# Patient Record
Sex: FEMALE | Race: WHITE
[De-identification: names, ages, dates, MRNs, and addresses within clinical notes are randomized per-mention and may not be internally consistent; named-entity substitution may affect disease eponyms.]

---

## 2021-03-11 ENCOUNTER — HOSPITAL ENCOUNTER (EMERGENCY)
Dept: HOSPITAL 7 - FB.ED | Age: 76
Discharge: HOME | End: 2021-03-11
Payer: MEDICARE

## 2021-03-11 DIAGNOSIS — M62.830: Primary | ICD-10-CM

## 2021-03-11 DIAGNOSIS — R14.3: ICD-10-CM

## 2021-03-11 DIAGNOSIS — Z79.899: ICD-10-CM

## 2021-03-11 DIAGNOSIS — Z88.0: ICD-10-CM

## 2021-03-11 PROCEDURE — 85027 COMPLETE CBC AUTOMATED: CPT

## 2021-03-11 PROCEDURE — 80053 COMPREHEN METABOLIC PANEL: CPT

## 2021-03-11 PROCEDURE — 99284 EMERGENCY DEPT VISIT MOD MDM: CPT

## 2021-03-11 PROCEDURE — 96374 THER/PROPH/DIAG INJ IV PUSH: CPT

## 2021-03-11 PROCEDURE — 96375 TX/PRO/DX INJ NEW DRUG ADDON: CPT

## 2021-03-11 PROCEDURE — 36415 COLL VENOUS BLD VENIPUNCTURE: CPT

## 2021-03-11 PROCEDURE — 86140 C-REACTIVE PROTEIN: CPT

## 2021-03-11 PROCEDURE — 81001 URINALYSIS AUTO W/SCOPE: CPT

## 2021-03-11 NOTE — EDM.PDOC
ED HPI GENERAL MEDICAL PROBLEM





- General


Chief Complaint: Abdominal Pain


Stated Complaint: LOWER BACK PAIN


Time Seen by Provider: 03/11/21 13:25


Source of Information: Reports: Patient


History Limitations: Reports: No Limitations





- History of Present Illness


INITIAL COMMENTS - FREE TEXT/NARRATIVE: 





states she developed sudden onset of left side lower back yesterday: localized ,

non radiating 


then got worse today with pain in the lower abd 


pain in lower abd seemed to be reduced by urination 


discussed this with pt 


states she had renal calculi many years ago 


Onset: Gradual


Onset Date: 03/10/21


Duration: Getting Worse


Location: Reports: Abdomen


Quality: Reports: Ache, Burning, Dull, Pressure


Severity: Moderate


Improves with: Reports: None


Worsens with: Reports: Movement


Associated Symptoms: Reports: Other (nausea , no vomiting)





- Related Data


                                    Allergies











Allergy/AdvReac Type Severity Reaction Status Date / Time


 


Penicillins Allergy  Fainting Verified 03/11/21 14:46











Home Meds: 


                                    Home Meds





Baclofen 10 mg PO BID #30 tablet 03/11/21 [Rx]


Lidocaine 5% [Lidoderm 5%] 1 patch TOP DAILY #30 patch 03/11/21 [Rx]


Multivitamin [Multiple Vitamins] 1 each PO DAILY 03/11/21 [History]


Ondansetron [Zofran ODT] 4 mg PO Q6H PRN #20 tab.dis 03/11/21 [Rx]


atorvaSTATin [Lipitor] 40 mg PO BEDTIME 03/11/21 [History]











Past Medical History


Musculoskeletal History: Reports: Back Pain, Chronic, Fracture


Other Musculoskeletal History: Left ankle fracture with surgical fixation





ED ROS GENERAL





- Review of Systems


Review Of Systems: Comprehensive ROS is negative, except as noted in HPI.





ED EXAM, RENAL/





- Physical Exam


Exam: See Below


Exam Limited By: No Limitations


General Appearance: Alert, WD/WN, No Apparent Distress


Eye Exam: Bilateral Eye: EOMI


Ears: Normal External Exam


Nose: Normal Inspection


Throat/Mouth: Normal Oropharynx


Head: Atraumatic, Normocephalic


Neck: Supple, Non-Tender


Respiratory/Chest: No Respiratory Distress, Lungs Clear, Rales


Cardiovascular: Regular Rate, Rhythm


GI/Abdominal: Distended, Tender (in lower abdomen)


Back Exam: Muscle Spasm (in SI joint region), Paraspinal Tenderness (on the left

 side)


Extremities: Normal Inspection, Normal Range of Motion


Neurological: Alert, Oriented, CN II-XII Intact


Psychiatric: Normal Affect


Skin Exam: Warm





Course





- Vital Signs


Last Recorded V/S: 


                                Last Vital Signs











Temp  36.8 C   03/11/21 15:01


 


Pulse  96   03/11/21 15:01


 


Resp  18   03/11/21 15:01


 


BP  103/52 L  03/11/21 15:01


 


Pulse Ox  99   03/11/21 15:01














- Orders/Labs/Meds


Labs: 


                                Laboratory Tests











  03/11/21 03/11/21 03/11/21 Range/Units





  13:31 13:45 13:45 


 


WBC   9.3   (3.0-10.3)  x10-3/uL


 


RBC   4.93   (3.60-5.20)  x10(6)uL


 


Hgb   14.5   (11.4-15.5)  g/dL


 


Hct   42.4   (34.2-48.2)  %


 


MCV   86.1   (76.7-100.5)  fL


 


MCH   29.4   (23.9-33.9)  pg


 


MCHC   34.1   (31.9-34.8)  g/dL


 


RDW   14.0   (12.3-16.5)  %


 


Plt Count   231   (151-488)  x10(3)uL


 


Sodium    139  (135-145)  mmol/L


 


Potassium    3.7  (3.5-5.3)  mmol/L


 


Chloride    101  (100-110)  mmol/L


 


Carbon Dioxide    27  (21-32)  mmol/L


 


BUN    15  (7-18)  mg/dL


 


Creatinine    1.0  (0.55-1.02)  mg/dL


 


Est Cr Clr Drug Dosing    TNP  


 


Estimated GFR (MDRD)    54 L  (>60)  


 


BUN/Creatinine Ratio    15.0  (9-20)  


 


Glucose    145 H  ()  mg/dL


 


Calcium    9.0  (8.6-10.2)  mg/dL


 


Total Bilirubin    0.9  (0.1-1.3)  mg/dL


 


AST    30 H  (5-25)  IU/L


 


ALT    33  (12-36)  U/L


 


Alkaline Phosphatase    58  ()  IU/L


 


C-Reactive Protein     (0.5-0.9)  mg/dL


 


Total Protein    7.7  (6.0-8.0)  g/dL


 


Albumin    4.6  (3.2-4.6)  g/dL


 


Globulin    3.1  g/dL


 


Albumin/Globulin Ratio    1.5  


 


Urine Color  Yellow    (YELLOW)  


 


Urine Appearance  Clear    (CLEAR)  


 


Urine pH  7.0 H    (5.0-6.5)  


 


Ur Specific Gravity  1.010    (1.010-1.025)  


 


Urine Protein  Negative    (NEGATIVE)  mg/dL


 


Urine Glucose (UA)  Normal    (NORMAL)  mg/dL


 


Urine Ketones  50 H    (NEGATIVE)  mg/dL


 


Urine Occult Blood  Negative    (NEGATIVE)  


 


Urine Nitrite  Negative    (NEGATIVE)  


 


Urine Bilirubin  Negative    (NEGATIVE)  


 


Urine Urobilinogen  Normal    (NEGATIVE)  mg/dL


 


Ur Leukocyte Esterase  Negative    (NEGATIVE)  


 


Urine WBC  0-5    (0-5)  


 


Ur Squamous Epith Cells  Few H    (NS,R,O)  


 


Urine Bacteria  Few H    (NS)  














  03/11/21 Range/Units





  13:45 


 


WBC   (3.0-10.3)  x10-3/uL


 


RBC   (3.60-5.20)  x10(6)uL


 


Hgb   (11.4-15.5)  g/dL


 


Hct   (34.2-48.2)  %


 


MCV   (76.7-100.5)  fL


 


MCH   (23.9-33.9)  pg


 


MCHC   (31.9-34.8)  g/dL


 


RDW   (12.3-16.5)  %


 


Plt Count   (151-488)  x10(3)uL


 


Sodium   (135-145)  mmol/L


 


Potassium   (3.5-5.3)  mmol/L


 


Chloride   (100-110)  mmol/L


 


Carbon Dioxide   (21-32)  mmol/L


 


BUN   (7-18)  mg/dL


 


Creatinine   (0.55-1.02)  mg/dL


 


Est Cr Clr Drug Dosing   


 


Estimated GFR (MDRD)   (>60)  


 


BUN/Creatinine Ratio   (9-20)  


 


Glucose   ()  mg/dL


 


Calcium   (8.6-10.2)  mg/dL


 


Total Bilirubin   (0.1-1.3)  mg/dL


 


AST   (5-25)  IU/L


 


ALT   (12-36)  U/L


 


Alkaline Phosphatase   ()  IU/L


 


C-Reactive Protein  < 0.2 L  (0.5-0.9)  mg/dL


 


Total Protein   (6.0-8.0)  g/dL


 


Albumin   (3.2-4.6)  g/dL


 


Globulin   g/dL


 


Albumin/Globulin Ratio   


 


Urine Color   (YELLOW)  


 


Urine Appearance   (CLEAR)  


 


Urine pH   (5.0-6.5)  


 


Ur Specific Gravity   (1.010-1.025)  


 


Urine Protein   (NEGATIVE)  mg/dL


 


Urine Glucose (UA)   (NORMAL)  mg/dL


 


Urine Ketones   (NEGATIVE)  mg/dL


 


Urine Occult Blood   (NEGATIVE)  


 


Urine Nitrite   (NEGATIVE)  


 


Urine Bilirubin   (NEGATIVE)  


 


Urine Urobilinogen   (NEGATIVE)  mg/dL


 


Ur Leukocyte Esterase   (NEGATIVE)  


 


Urine WBC   (0-5)  


 


Ur Squamous Epith Cells   (NS,R,O)  


 


Urine Bacteria   (NS)  











Meds: 


Medications














Discontinued Medications














Generic Name Dose Route Start Last Admin





  Trade Name Freq  PRN Reason Stop Dose Admin


 


Baclofen  10 mg  03/11/21 15:48  03/11/21 16:09





  Baclofen 10 Mg Tab  PO  03/11/21 15:49  10 mg





  ONETIME ONE   Administration


 


Sodium Chloride  1,000 mls @ 999 mls/hr  03/11/21 13:30  03/11/21 14:47





  Normal Saline  IV   999 mls/hr





  ASDIRECTED ANURAG   Administration


 


Lidocaine  1 each  03/11/21 15:51  03/11/21 16:09





  Lidocaine 4% 1 Each Patch  TOP  03/11/21 15:52  1 each





  NOW STA   Administration


 


Morphine Sulfate  4 mg  03/11/21 13:31  03/11/21 14:51





  Morphine 4 Mg/Ml Vial  IVPUSH  03/11/21 13:32  4 mg





  ONETIME ONE   Administration


 


Ondansetron HCl  4 mg  03/11/21 13:31  03/11/21 14:54





  Ondansetron 4 Mg/2 Ml Sdv  IVPUSH  03/11/21 13:32  4 mg





  ONETIME ONE   Administration














- Re-Assessments/Exams


Free Text/Narrative Re-Assessment/Exam: 





03/11/21 15:57


Pt had IV zofran morphine and fluids, had BM and abd pain resolved , still had 

pain localized in the right SI joint region





Departure





- Departure


Time of Disposition: 17:00


Disposition: Home, Self-Care 01


Condition: Fair


Clinical Impression: 


 Abdominal pain, Abdominal distension (gaseous), Left paraspinal back pain, 

Paraspinal muscle spasm








- Discharge Information


*PRESCRIPTION DRUG MONITORING PROGRAM REVIEWED*: Not Applicable


*COPY OF PRESCRIPTION DRUG MONITORING REPORT IN PATIENT ANTONIO: Not Applicable


Prescriptions: 


Baclofen 10 mg PO BID #30 tablet


Lidocaine 5% [Lidoderm 5%] 1 patch TOP DAILY #30 patch


Ondansetron [Zofran ODT] 4 mg PO Q6H PRN #20 tab.dis


 PRN Reason: Nausea


Instructions:  Acute Back Pain, Adult


Referrals: 


Betito Callaway MD [Primary Care Provider] - 


Forms:  ED Department Discharge


Additional Instructions: 


Warm compress to affected area 3 times daily


use patch and massage area as needed